# Patient Record
Sex: MALE | Race: BLACK OR AFRICAN AMERICAN | URBAN - METROPOLITAN AREA
[De-identification: names, ages, dates, MRNs, and addresses within clinical notes are randomized per-mention and may not be internally consistent; named-entity substitution may affect disease eponyms.]

---

## 2021-03-01 ENCOUNTER — OFFICE VISIT (OUTPATIENT)
Dept: URBAN - METROPOLITAN AREA CLINIC 118 | Facility: CLINIC | Age: 81
End: 2021-03-01

## 2021-03-03 ENCOUNTER — OFFICE VISIT (OUTPATIENT)
Dept: URBAN - METROPOLITAN AREA CLINIC 118 | Facility: CLINIC | Age: 81
End: 2021-03-03
Payer: MEDICARE

## 2021-03-03 VITALS
TEMPERATURE: 97.5 F | DIASTOLIC BLOOD PRESSURE: 78 MMHG | HEART RATE: 60 BPM | HEIGHT: 70 IN | BODY MASS INDEX: 30.06 KG/M2 | WEIGHT: 210 LBS | SYSTOLIC BLOOD PRESSURE: 156 MMHG

## 2021-03-03 DIAGNOSIS — K62.89 ANAL PAIN: ICD-10-CM

## 2021-03-03 PROCEDURE — 99203 OFFICE O/P NEW LOW 30 MIN: CPT | Performed by: INTERNAL MEDICINE

## 2021-03-03 NOTE — HPI-TODAY'S VISIT:
81 yo BM referred by Dr. Fry, for anal pain with defecation, starting ~ 1 month ago.  Went to ER on 2/8/21 and was given hydrocortisone suppository.  He is better, but still has mild difficulty with BM.  BMs q 3 days, no change.  No GI bleed.  No abd pain, N/V, weight loss.

## 2021-12-30 NOTE — PHYSICAL EXAM CONSTITUTIONAL:
Problem: Patient Centered Care  Goal: Patient preferences are identified and integrated in the patient's plan of care  Description: Interventions:  - What would you like us to know as we care for you?   - Provide timely, complete, and accurate informatio well developed, well nourished , in no acute distress , ambulating without difficulty , normal communication ability call for assistance with activity based on assessment  - Modify environment to reduce risk of injury  - Provide assistive devices as appropriate  - Consider OT/PT consult to assist with strengthening/mobility  - Encourage toileting schedule  Outcome: Progr pain/discomfort. Tolerating diet. Call light within reach. Bed in lowest and locked position. Plan for rehab pending facility choice.

## 2022-11-10 ENCOUNTER — OFFICE VISIT (OUTPATIENT)
Dept: URBAN - METROPOLITAN AREA CLINIC 118 | Facility: CLINIC | Age: 82
End: 2022-11-10
Payer: MEDICARE

## 2022-11-10 VITALS
HEART RATE: 69 BPM | TEMPERATURE: 98.4 F | HEIGHT: 70 IN | WEIGHT: 208 LBS | SYSTOLIC BLOOD PRESSURE: 128 MMHG | DIASTOLIC BLOOD PRESSURE: 73 MMHG | BODY MASS INDEX: 29.78 KG/M2

## 2022-11-10 DIAGNOSIS — K64.1 GRADE II INTERNAL HEMORRHOIDS: ICD-10-CM

## 2022-11-10 PROCEDURE — 46221 LIGATION OF HEMORRHOID(S): CPT | Performed by: INTERNAL MEDICINE

## 2022-11-10 PROCEDURE — 99203 OFFICE O/P NEW LOW 30 MIN: CPT | Performed by: INTERNAL MEDICINE

## 2022-11-10 PROCEDURE — 99213 OFFICE O/P EST LOW 20 MIN: CPT | Performed by: INTERNAL MEDICINE

## 2022-11-10 NOTE — HPI-TODAY'S VISIT:
referred by Dr WATTS reports symptoms of anal swelling and difficulty with cleaning with wiping was given suppositories from hospital that helped a little

## 2022-11-10 NOTE — EXAM-PHYSICAL EXAM
Lola in room as chaperone Rectal exam: normal external exam  Anoscopy performed using self lighted anoscope Grade 2 RA, Grade 2 RP, Grade 2 LL  Banded RP and  LL using [facility] Periscopean system without complication after reviewing R/B/A with the patient

## 2022-11-10 NOTE — PHYSICAL EXAM HENT:
Head , normocephalic , atraumatic , Face , Face within normal limits , Ears , External ears within normal limits , Nose/Nasopharynx , External nose  normal appearance , Mouth and Throat , Oral cavity appearance normal
2/day(s)

## 2023-11-03 ENCOUNTER — OFFICE VISIT (OUTPATIENT)
Dept: URBAN - METROPOLITAN AREA CLINIC 118 | Facility: CLINIC | Age: 83
End: 2023-11-03
Payer: MEDICARE

## 2023-11-03 VITALS
BODY MASS INDEX: 30.66 KG/M2 | HEIGHT: 69 IN | DIASTOLIC BLOOD PRESSURE: 76 MMHG | TEMPERATURE: 97.2 F | HEART RATE: 54 BPM | WEIGHT: 207 LBS | SYSTOLIC BLOOD PRESSURE: 153 MMHG

## 2023-11-03 DIAGNOSIS — K92.1 BLOOD IN STOOL: ICD-10-CM

## 2023-11-03 DIAGNOSIS — K59.09 OTHER CONSTIPATION: ICD-10-CM

## 2023-11-03 PROBLEM — 405729008: Status: ACTIVE | Noted: 2023-11-03

## 2023-11-03 PROBLEM — 14760008: Status: ACTIVE | Noted: 2023-11-03

## 2023-11-03 PROCEDURE — 99204 OFFICE O/P NEW MOD 45 MIN: CPT | Performed by: STUDENT IN AN ORGANIZED HEALTH CARE EDUCATION/TRAINING PROGRAM

## 2023-11-03 RX ORDER — POLYETHYLENE GLYCOL 3350 17 G/17G
1 PACKET MIXED WITH 8 OUNCES OF FLUID POWDER, FOR SOLUTION ORAL ONCE A DAY
Qty: 30 | Refills: 1 | OUTPATIENT
Start: 2023-11-03 | End: 2024-01-01

## 2023-11-03 NOTE — HPI-TODAY'S VISIT:
The patient is an 83-year-old male with history of hypertension, BPH, and constipation who presents for blood in his stool.  The patient states that he was sent here by his PCP after stool test showed blood in his stool.  The patient denies seeing any tereza blood in his stool or noticing that his stool was darker than normal.  He does have constipation at baseline and reports that he has a bowel movement 3-4 times a week.  Each time he has a bowel movement it is associated with straining and he states that his stools come out as small olaf sometimes.  He was last seen in clinic in November 2022 where an anoscopy showed grade 2 internal hemorrhoids and banding was performed.  Prior to that he was seen in clinic for constipation and MiraLAX and fiber were recommended.  He was taking Metamucil which helped with his constipation, but he has not been consistently taking this medication.  He also states that he likely does not drink enough water.  The patient denies abdominal pain, distention, nausea, vomiting, anal pain/pain with bowel movements, or changes in his weight recently.

## 2023-11-10 ENCOUNTER — TELEPHONE ENCOUNTER (OUTPATIENT)
Dept: URBAN - METROPOLITAN AREA CLINIC 118 | Facility: CLINIC | Age: 83
End: 2023-11-10

## 2024-01-04 ENCOUNTER — OFFICE VISIT (OUTPATIENT)
Dept: URBAN - METROPOLITAN AREA CLINIC 118 | Facility: CLINIC | Age: 84
End: 2024-01-04

## 2024-01-12 ENCOUNTER — OFFICE VISIT (OUTPATIENT)
Dept: URBAN - METROPOLITAN AREA CLINIC 118 | Facility: CLINIC | Age: 84
End: 2024-01-12
Payer: MEDICARE

## 2024-01-12 VITALS
HEART RATE: 81 BPM | BODY MASS INDEX: 30.51 KG/M2 | HEIGHT: 69 IN | SYSTOLIC BLOOD PRESSURE: 126 MMHG | WEIGHT: 206 LBS | DIASTOLIC BLOOD PRESSURE: 75 MMHG | TEMPERATURE: 97.5 F

## 2024-01-12 DIAGNOSIS — K59.09 OTHER CONSTIPATION: ICD-10-CM

## 2024-01-12 PROCEDURE — 99204 OFFICE O/P NEW MOD 45 MIN: CPT | Performed by: STUDENT IN AN ORGANIZED HEALTH CARE EDUCATION/TRAINING PROGRAM

## 2024-01-12 PROCEDURE — 99214 OFFICE O/P EST MOD 30 MIN: CPT | Performed by: STUDENT IN AN ORGANIZED HEALTH CARE EDUCATION/TRAINING PROGRAM

## 2024-01-12 NOTE — HPI-TODAY'S VISIT:
The patient was last seen in clinic on 11/3/2023.  At that visit, the patient noted that he had blood in his stool on a stool test that was ordered by his PCP.  The patient denied any bright red blood per rectum or dark stools.  He did complain however of constipation.  He had a anoscopy in November 2022 which showed grade 2 internal hemorrhoids which were banded.  He states that his last colonoscopy was roughly 3 years ago (we do not have record of this).  Metamucil and MiraLAX daily were recommended.  The patient states that he was not able to  MiraLAX.  When he went to the pharmacy, the pharmacist told him he did not need that medication and offered him an alternative which he did not take.  He has been taking Metamucil and using more natural remedies such as prune juice.  He feels that his constipation has greatly improved and his bowel movements are back to normal.  He has not noticed any blood in his stool.  He still wonders if it is beneficial for him to have a colonoscopy at this point.  Patient denies any weight loss, abdominal pain, bloating, nausea or vomiting.

## 2024-01-24 ENCOUNTER — TELEPHONE ENCOUNTER (OUTPATIENT)
Dept: URBAN - METROPOLITAN AREA CLINIC 118 | Facility: CLINIC | Age: 84
End: 2024-01-24

## 2024-03-08 ENCOUNTER — OV EP (OUTPATIENT)
Dept: URBAN - METROPOLITAN AREA CLINIC 118 | Facility: CLINIC | Age: 84
End: 2024-03-08
Payer: MEDICARE

## 2024-03-08 VITALS
WEIGHT: 207.2 LBS | BODY MASS INDEX: 31.4 KG/M2 | HEIGHT: 68 IN | TEMPERATURE: 97.7 F | DIASTOLIC BLOOD PRESSURE: 82 MMHG | HEART RATE: 80 BPM | SYSTOLIC BLOOD PRESSURE: 134 MMHG

## 2024-03-08 DIAGNOSIS — K92.1 BLOOD IN STOOL: ICD-10-CM

## 2024-03-08 DIAGNOSIS — K59.09 OTHER CONSTIPATION: ICD-10-CM

## 2024-03-08 PROCEDURE — 99204 OFFICE O/P NEW MOD 45 MIN: CPT | Performed by: STUDENT IN AN ORGANIZED HEALTH CARE EDUCATION/TRAINING PROGRAM

## 2024-03-08 RX ORDER — LORATADINE 10 MG
1 PACKET MIXED WITH 8 OUNCES OF FLUID TABLET,DISINTEGRATING ORAL ONCE A DAY
Qty: 30 | Refills: 3 | OUTPATIENT
Start: 2024-03-08 | End: 2024-07-06

## 2024-03-08 NOTE — HPI-TODAY'S VISIT:
The patient is an 83-year-old male with history of hypertension, BPH, and constipation who presents for blood in his stool. The patient was last seen in clinic on 1/12/24.  At his last visit Metamucil was recommended. We do not have records from his last colonoscopy however he was initially referred for a stool test which showed blood (details unclear including what test this was, and if it was indicated for CRC surveillance). The patient is currently 83 and in the absence of history of high risk polyps or overt GI bleeding, does not have an indication for colonoscopy at this time. I have tried reaching out to his PCP regarding this but have been unsuccessful.  Blood work from 1/2024 reveals a normal hemoglobin.  The patient complains today of constipation.  He states that he last had a normal bowel movement about 2 days ago.  He continues on Metamucil daily and has been trying to keep up with his water intake.  Aside from Metamucil, he has not tried anything for constipation in the past.  The patient denies nausea, vomiting, abdominal pain, noticeable blood in his stool, or changes to his weight.

## 2024-05-20 ENCOUNTER — OFFICE VISIT (OUTPATIENT)
Dept: URBAN - METROPOLITAN AREA CLINIC 118 | Facility: CLINIC | Age: 84
End: 2024-05-20

## 2024-05-20 RX ORDER — LORATADINE 10 MG
1 PACKET MIXED WITH 8 OUNCES OF FLUID TABLET,DISINTEGRATING ORAL ONCE A DAY
Qty: 30 | Refills: 3 | Status: ACTIVE | COMMUNITY
Start: 2024-03-08 | End: 2024-07-06